# Patient Record
Sex: FEMALE | Race: WHITE | NOT HISPANIC OR LATINO
[De-identification: names, ages, dates, MRNs, and addresses within clinical notes are randomized per-mention and may not be internally consistent; named-entity substitution may affect disease eponyms.]

---

## 2017-02-21 ENCOUNTER — ASOB RESULT (OUTPATIENT)
Age: 32
End: 2017-02-21

## 2017-02-21 ENCOUNTER — APPOINTMENT (OUTPATIENT)
Dept: ANTEPARTUM | Facility: CLINIC | Age: 32
End: 2017-02-21

## 2017-03-22 ENCOUNTER — ASOB RESULT (OUTPATIENT)
Age: 32
End: 2017-03-22

## 2017-03-22 ENCOUNTER — APPOINTMENT (OUTPATIENT)
Dept: ANTEPARTUM | Facility: CLINIC | Age: 32
End: 2017-03-22

## 2017-05-03 ENCOUNTER — APPOINTMENT (OUTPATIENT)
Dept: ANTEPARTUM | Facility: CLINIC | Age: 32
End: 2017-05-03

## 2017-05-03 ENCOUNTER — ASOB RESULT (OUTPATIENT)
Age: 32
End: 2017-05-03

## 2017-06-12 ENCOUNTER — APPOINTMENT (OUTPATIENT)
Dept: ANTEPARTUM | Facility: CLINIC | Age: 32
End: 2017-06-12

## 2017-06-12 ENCOUNTER — ASOB RESULT (OUTPATIENT)
Age: 32
End: 2017-06-12

## 2017-07-03 ENCOUNTER — APPOINTMENT (OUTPATIENT)
Dept: ANTEPARTUM | Facility: CLINIC | Age: 32
End: 2017-07-03

## 2017-07-03 ENCOUNTER — OUTPATIENT (OUTPATIENT)
Dept: OUTPATIENT SERVICES | Facility: HOSPITAL | Age: 32
LOS: 1 days | End: 2017-07-03
Payer: COMMERCIAL

## 2017-07-03 ENCOUNTER — ASOB RESULT (OUTPATIENT)
Age: 32
End: 2017-07-03

## 2017-07-03 DIAGNOSIS — Z01.818 ENCOUNTER FOR OTHER PREPROCEDURAL EXAMINATION: ICD-10-CM

## 2017-07-03 PROCEDURE — 76818 FETAL BIOPHYS PROFILE W/NST: CPT

## 2017-07-03 PROCEDURE — 76816 OB US FOLLOW-UP PER FETUS: CPT

## 2017-07-05 ENCOUNTER — ASOB RESULT (OUTPATIENT)
Age: 32
End: 2017-07-05

## 2017-07-05 ENCOUNTER — OUTPATIENT (OUTPATIENT)
Dept: OUTPATIENT SERVICES | Facility: HOSPITAL | Age: 32
LOS: 1 days | End: 2017-07-05
Payer: COMMERCIAL

## 2017-07-05 ENCOUNTER — APPOINTMENT (OUTPATIENT)
Dept: ANTEPARTUM | Facility: CLINIC | Age: 32
End: 2017-07-05

## 2017-07-05 DIAGNOSIS — O48.0 POST-TERM PREGNANCY: ICD-10-CM

## 2017-07-05 DIAGNOSIS — Z36 ENCOUNTER FOR ANTENATAL SCREENING OF MOTHER: ICD-10-CM

## 2017-07-05 DIAGNOSIS — O35.8XX0 MATERNAL CARE FOR OTHER (SUSPECTED) FETAL ABNORMALITY AND DAMAGE, NOT APPLICABLE OR UNSPECIFIED: ICD-10-CM

## 2017-07-05 DIAGNOSIS — Z3A.00 WEEKS OF GESTATION OF PREGNANCY NOT SPECIFIED: ICD-10-CM

## 2017-07-05 DIAGNOSIS — O26.899 OTHER SPECIFIED PREGNANCY RELATED CONDITIONS, UNSPECIFIED TRIMESTER: ICD-10-CM

## 2017-07-05 PROCEDURE — 59025 FETAL NON-STRESS TEST: CPT | Mod: 26

## 2017-07-05 PROCEDURE — G0463: CPT

## 2017-07-05 PROCEDURE — 59025 FETAL NON-STRESS TEST: CPT

## 2017-07-06 ENCOUNTER — APPOINTMENT (OUTPATIENT)
Dept: ANTEPARTUM | Facility: CLINIC | Age: 32
End: 2017-07-06

## 2017-07-06 ENCOUNTER — INPATIENT (INPATIENT)
Facility: HOSPITAL | Age: 32
LOS: 1 days | Discharge: HOME | End: 2017-07-08
Attending: OBSTETRICS & GYNECOLOGY | Admitting: OBSTETRICS & GYNECOLOGY
Payer: COMMERCIAL

## 2017-07-06 ENCOUNTER — RESULT REVIEW (OUTPATIENT)
Age: 32
End: 2017-07-06

## 2017-07-06 VITALS — WEIGHT: 176.37 LBS | HEIGHT: 66 IN

## 2017-07-06 DIAGNOSIS — O26.899 OTHER SPECIFIED PREGNANCY RELATED CONDITIONS, UNSPECIFIED TRIMESTER: ICD-10-CM

## 2017-07-06 DIAGNOSIS — Z34.80 ENCOUNTER FOR SUPERVISION OF OTHER NORMAL PREGNANCY, UNSPECIFIED TRIMESTER: ICD-10-CM

## 2017-07-06 DIAGNOSIS — Z3A.00 WEEKS OF GESTATION OF PREGNANCY NOT SPECIFIED: ICD-10-CM

## 2017-07-06 LAB
BLD GP AB SCN SERPL QL: NEGATIVE — SIGNIFICANT CHANGE UP
HCT VFR BLD CALC: 38 % — SIGNIFICANT CHANGE UP (ref 34.5–45)
HGB BLD-MCNC: 13.4 G/DL — SIGNIFICANT CHANGE UP (ref 11.5–15.5)
MCHC RBC-ENTMCNC: 33.1 PG — SIGNIFICANT CHANGE UP (ref 27–34)
MCHC RBC-ENTMCNC: 35.2 GM/DL — SIGNIFICANT CHANGE UP (ref 32–36)
MCV RBC AUTO: 94.2 FL — SIGNIFICANT CHANGE UP (ref 80–100)
PLATELET # BLD AUTO: 249 K/UL — SIGNIFICANT CHANGE UP (ref 150–400)
RBC # BLD: 4.04 M/UL — SIGNIFICANT CHANGE UP (ref 3.8–5.2)
RBC # FLD: 12.6 % — SIGNIFICANT CHANGE UP (ref 10.3–14.5)
RH IG SCN BLD-IMP: POSITIVE — SIGNIFICANT CHANGE UP
WBC # BLD: 9.9 K/UL — SIGNIFICANT CHANGE UP (ref 3.8–10.5)
WBC # FLD AUTO: 9.9 K/UL — SIGNIFICANT CHANGE UP (ref 3.8–10.5)

## 2017-07-06 PROCEDURE — 88305 TISSUE EXAM BY PATHOLOGIST: CPT | Mod: 26

## 2017-07-06 RX ORDER — OXYTOCIN 10 UNIT/ML
41.67 VIAL (ML) INJECTION
Qty: 20 | Refills: 0 | Status: DISCONTINUED | OUTPATIENT
Start: 2017-07-06 | End: 2017-07-08

## 2017-07-06 RX ORDER — OXYCODONE HYDROCHLORIDE 5 MG/1
5 TABLET ORAL
Qty: 0 | Refills: 0 | Status: DISCONTINUED | OUTPATIENT
Start: 2017-07-06 | End: 2017-07-08

## 2017-07-06 RX ORDER — SODIUM CHLORIDE 9 MG/ML
1000 INJECTION, SOLUTION INTRAVENOUS
Qty: 0 | Refills: 0 | Status: DISCONTINUED | OUTPATIENT
Start: 2017-07-06 | End: 2017-07-08

## 2017-07-06 RX ORDER — PRAMOXINE HYDROCHLORIDE 150 MG/15G
1 AEROSOL, FOAM RECTAL EVERY 4 HOURS
Qty: 0 | Refills: 0 | Status: DISCONTINUED | OUTPATIENT
Start: 2017-07-06 | End: 2017-07-08

## 2017-07-06 RX ORDER — PENICILLIN G POTASSIUM 5000000 [IU]/1
POWDER, FOR SOLUTION INTRAMUSCULAR; INTRAPLEURAL; INTRATHECAL; INTRAVENOUS
Qty: 0 | Refills: 0 | Status: DISCONTINUED | OUTPATIENT
Start: 2017-07-06 | End: 2017-07-06

## 2017-07-06 RX ORDER — HYDROCORTISONE 1 %
1 OINTMENT (GRAM) TOPICAL EVERY 4 HOURS
Qty: 0 | Refills: 0 | Status: DISCONTINUED | OUTPATIENT
Start: 2017-07-06 | End: 2017-07-08

## 2017-07-06 RX ORDER — SIMETHICONE 80 MG/1
80 TABLET, CHEWABLE ORAL EVERY 6 HOURS
Qty: 0 | Refills: 0 | Status: DISCONTINUED | OUTPATIENT
Start: 2017-07-06 | End: 2017-07-08

## 2017-07-06 RX ORDER — DIPHENHYDRAMINE HCL 50 MG
25 CAPSULE ORAL EVERY 6 HOURS
Qty: 0 | Refills: 0 | Status: DISCONTINUED | OUTPATIENT
Start: 2017-07-06 | End: 2017-07-08

## 2017-07-06 RX ORDER — GLYCERIN ADULT
1 SUPPOSITORY, RECTAL RECTAL AT BEDTIME
Qty: 0 | Refills: 0 | Status: DISCONTINUED | OUTPATIENT
Start: 2017-07-06 | End: 2017-07-08

## 2017-07-06 RX ORDER — SODIUM CHLORIDE 9 MG/ML
500 INJECTION, SOLUTION INTRAVENOUS ONCE
Qty: 0 | Refills: 0 | Status: DISCONTINUED | OUTPATIENT
Start: 2017-07-06 | End: 2017-07-06

## 2017-07-06 RX ORDER — OXYCODONE HYDROCHLORIDE 5 MG/1
5 TABLET ORAL EVERY 4 HOURS
Qty: 0 | Refills: 0 | Status: DISCONTINUED | OUTPATIENT
Start: 2017-07-06 | End: 2017-07-08

## 2017-07-06 RX ORDER — AER TRAVELER 0.5 G/1
1 SOLUTION RECTAL; TOPICAL EVERY 4 HOURS
Qty: 0 | Refills: 0 | Status: DISCONTINUED | OUTPATIENT
Start: 2017-07-06 | End: 2017-07-08

## 2017-07-06 RX ORDER — ACETAMINOPHEN 500 MG
975 TABLET ORAL EVERY 6 HOURS
Qty: 0 | Refills: 0 | Status: COMPLETED | OUTPATIENT
Start: 2017-07-06 | End: 2018-06-04

## 2017-07-06 RX ORDER — MAGNESIUM HYDROXIDE 400 MG/1
30 TABLET, CHEWABLE ORAL
Qty: 0 | Refills: 0 | Status: DISCONTINUED | OUTPATIENT
Start: 2017-07-06 | End: 2017-07-08

## 2017-07-06 RX ORDER — OXYTOCIN 10 UNIT/ML
333.33 VIAL (ML) INJECTION
Qty: 20 | Refills: 0 | Status: DISCONTINUED | OUTPATIENT
Start: 2017-07-06 | End: 2017-07-06

## 2017-07-06 RX ORDER — DIBUCAINE 1 %
1 OINTMENT (GRAM) RECTAL EVERY 4 HOURS
Qty: 0 | Refills: 0 | Status: DISCONTINUED | OUTPATIENT
Start: 2017-07-06 | End: 2017-07-08

## 2017-07-06 RX ORDER — KETOROLAC TROMETHAMINE 30 MG/ML
30 SYRINGE (ML) INJECTION ONCE
Qty: 0 | Refills: 0 | Status: DISCONTINUED | OUTPATIENT
Start: 2017-07-06 | End: 2017-07-06

## 2017-07-06 RX ORDER — TETANUS TOXOID, REDUCED DIPHTHERIA TOXOID AND ACELLULAR PERTUSSIS VACCINE, ADSORBED 5; 2.5; 8; 8; 2.5 [IU]/.5ML; [IU]/.5ML; UG/.5ML; UG/.5ML; UG/.5ML
0.5 SUSPENSION INTRAMUSCULAR ONCE
Qty: 0 | Refills: 0 | Status: DISCONTINUED | OUTPATIENT
Start: 2017-07-06 | End: 2017-07-08

## 2017-07-06 RX ORDER — SODIUM CHLORIDE 9 MG/ML
3 INJECTION INTRAMUSCULAR; INTRAVENOUS; SUBCUTANEOUS EVERY 8 HOURS
Qty: 0 | Refills: 0 | Status: DISCONTINUED | OUTPATIENT
Start: 2017-07-06 | End: 2017-07-08

## 2017-07-06 RX ORDER — SODIUM CHLORIDE 9 MG/ML
1000 INJECTION, SOLUTION INTRAVENOUS
Qty: 0 | Refills: 0 | Status: DISCONTINUED | OUTPATIENT
Start: 2017-07-06 | End: 2017-07-06

## 2017-07-06 RX ORDER — LANOLIN
1 OINTMENT (GRAM) TOPICAL EVERY 6 HOURS
Qty: 0 | Refills: 0 | Status: DISCONTINUED | OUTPATIENT
Start: 2017-07-06 | End: 2017-07-08

## 2017-07-06 RX ORDER — OXYTOCIN 10 UNIT/ML
4 VIAL (ML) INJECTION
Qty: 30 | Refills: 0 | Status: DISCONTINUED | OUTPATIENT
Start: 2017-07-06 | End: 2017-07-08

## 2017-07-06 RX ORDER — PENICILLIN G POTASSIUM 5000000 [IU]/1
5 POWDER, FOR SOLUTION INTRAMUSCULAR; INTRAPLEURAL; INTRATHECAL; INTRAVENOUS ONCE
Qty: 0 | Refills: 0 | Status: COMPLETED | OUTPATIENT
Start: 2017-07-06 | End: 2017-07-06

## 2017-07-06 RX ORDER — CITRIC ACID/SODIUM CITRATE 300-500 MG
15 SOLUTION, ORAL ORAL EVERY 4 HOURS
Qty: 0 | Refills: 0 | Status: DISCONTINUED | OUTPATIENT
Start: 2017-07-06 | End: 2017-07-06

## 2017-07-06 RX ORDER — IBUPROFEN 200 MG
600 TABLET ORAL EVERY 6 HOURS
Qty: 0 | Refills: 0 | Status: COMPLETED | OUTPATIENT
Start: 2017-07-06 | End: 2018-06-04

## 2017-07-06 RX ORDER — DOCUSATE SODIUM 100 MG
100 CAPSULE ORAL
Qty: 0 | Refills: 0 | Status: DISCONTINUED | OUTPATIENT
Start: 2017-07-06 | End: 2017-07-08

## 2017-07-06 RX ADMIN — PENICILLIN G POTASSIUM 200 MILLION UNIT(S): 5000000 POWDER, FOR SOLUTION INTRAMUSCULAR; INTRAPLEURAL; INTRATHECAL; INTRAVENOUS at 15:30

## 2017-07-06 RX ADMIN — Medication 125 MILLIUNIT(S)/MIN: at 20:38

## 2017-07-06 RX ADMIN — Medication 30 MILLIGRAM(S): at 20:36

## 2017-07-06 RX ADMIN — Medication 30 MILLIGRAM(S): at 22:55

## 2017-07-07 LAB
HCT VFR BLD CALC: 35.5 % — SIGNIFICANT CHANGE UP (ref 34.5–45)
HGB BLD-MCNC: 12.1 G/DL — SIGNIFICANT CHANGE UP (ref 11.5–15.5)
T PALLIDUM AB TITR SER: NEGATIVE — SIGNIFICANT CHANGE UP

## 2017-07-07 RX ORDER — ACETAMINOPHEN 500 MG
975 TABLET ORAL EVERY 6 HOURS
Qty: 0 | Refills: 0 | Status: DISCONTINUED | OUTPATIENT
Start: 2017-07-07 | End: 2017-07-08

## 2017-07-07 RX ORDER — IBUPROFEN 200 MG
600 TABLET ORAL EVERY 6 HOURS
Qty: 0 | Refills: 0 | Status: DISCONTINUED | OUTPATIENT
Start: 2017-07-07 | End: 2017-07-08

## 2017-07-07 RX ADMIN — SODIUM CHLORIDE 3 MILLILITER(S): 9 INJECTION INTRAMUSCULAR; INTRAVENOUS; SUBCUTANEOUS at 06:33

## 2017-07-07 RX ADMIN — Medication 975 MILLIGRAM(S): at 01:00

## 2017-07-07 RX ADMIN — Medication 100 MILLIGRAM(S): at 06:21

## 2017-07-07 RX ADMIN — Medication 600 MILLIGRAM(S): at 11:08

## 2017-07-07 RX ADMIN — Medication 600 MILLIGRAM(S): at 23:17

## 2017-07-07 RX ADMIN — Medication 600 MILLIGRAM(S): at 05:00

## 2017-07-07 RX ADMIN — Medication 975 MILLIGRAM(S): at 11:08

## 2017-07-07 RX ADMIN — Medication 600 MILLIGRAM(S): at 17:14

## 2017-07-07 RX ADMIN — Medication 975 MILLIGRAM(S): at 07:00

## 2017-07-07 RX ADMIN — Medication 600 MILLIGRAM(S): at 04:06

## 2017-07-07 RX ADMIN — Medication 600 MILLIGRAM(S): at 17:49

## 2017-07-07 RX ADMIN — Medication 1 TABLET(S): at 11:08

## 2017-07-07 RX ADMIN — Medication 975 MILLIGRAM(S): at 11:53

## 2017-07-07 RX ADMIN — Medication 975 MILLIGRAM(S): at 06:21

## 2017-07-07 RX ADMIN — Medication 600 MILLIGRAM(S): at 11:53

## 2017-07-07 RX ADMIN — Medication 975 MILLIGRAM(S): at 00:15

## 2017-07-07 NOTE — DISCHARGE NOTE OB - CARE PROVIDER_API CALL
Annette Trujillo), Obstetrics and Gynecology  [address] [facility] [address]  [place] [address]  Phone: [phone]  Fax: [phone]

## 2017-07-07 NOTE — PROVIDER CONTACT NOTE (CHANGE IN STATUS NOTIFICATION) - ASSESSMENT
pt is alert and oriented , no c/o dizziness or nausea. no clots noted. fundus firm, VS recorded 104/67 HR= 67 O2 sat =97 room air.  2nd bag oxytocin IVF completed . pt voided 350 spontaneously.

## 2017-07-07 NOTE — DISCHARGE NOTE OB - PATIENT PORTAL LINK FT
“You can access the FollowHealth Patient Portal, offered by [facility], by registering with the following website: [email or web address]”

## 2017-07-07 NOTE — DISCHARGE NOTE OB - MATERIALS PROVIDED
[facility] Screening Program/Breastfeeding Guide and Packet/  Immunization Record/Guide to Postpartum Care/Breastfeeding Log/Breastfeeding Mother’s Support Group Information

## 2017-07-08 VITALS
SYSTOLIC BLOOD PRESSURE: 93 MMHG | RESPIRATION RATE: 18 BRPM | DIASTOLIC BLOOD PRESSURE: 68 MMHG | HEART RATE: 83 BPM | TEMPERATURE: 99 F

## 2017-07-08 PROCEDURE — 59050 FETAL MONITOR W/REPORT: CPT

## 2017-07-08 PROCEDURE — G0463: CPT

## 2017-07-08 PROCEDURE — 59025 FETAL NON-STRESS TEST: CPT

## 2017-07-08 PROCEDURE — 85027 COMPLETE CBC AUTOMATED: CPT

## 2017-07-08 PROCEDURE — 88305 TISSUE EXAM BY PATHOLOGIST: CPT

## 2017-07-08 PROCEDURE — 86900 BLOOD TYPING SEROLOGIC ABO: CPT

## 2017-07-08 PROCEDURE — 86780 TREPONEMA PALLIDUM: CPT

## 2017-07-08 PROCEDURE — 86850 RBC ANTIBODY SCREEN: CPT

## 2017-07-08 PROCEDURE — 85018 HEMOGLOBIN: CPT

## 2017-07-08 PROCEDURE — 86901 BLOOD TYPING SEROLOGIC RH(D): CPT

## 2017-07-08 RX ADMIN — Medication 600 MILLIGRAM(S): at 09:23

## 2017-07-08 RX ADMIN — Medication 600 MILLIGRAM(S): at 10:15

## 2017-07-08 RX ADMIN — Medication 600 MILLIGRAM(S): at 21:00

## 2017-07-08 RX ADMIN — Medication 975 MILLIGRAM(S): at 05:00

## 2017-07-08 RX ADMIN — Medication 100 MILLIGRAM(S): at 11:35

## 2017-07-08 RX ADMIN — Medication 975 MILLIGRAM(S): at 03:56

## 2017-07-08 RX ADMIN — Medication 600 MILLIGRAM(S): at 14:48

## 2017-07-08 RX ADMIN — Medication 600 MILLIGRAM(S): at 00:17

## 2017-07-08 RX ADMIN — Medication 600 MILLIGRAM(S): at 20:25

## 2017-07-08 RX ADMIN — Medication 600 MILLIGRAM(S): at 15:45

## 2017-07-08 NOTE — PROGRESS NOTE ADULT - PROBLEM SELECTOR PLAN 1
- Continue with po analgesia  - Increase ambulation  - Continue regular diet  - IV lock  - H&H today

## 2017-07-08 NOTE — PROGRESS NOTE ADULT - SUBJECTIVE AND OBJECTIVE BOX
Vital Signs Last 24 Hrs  T(C): 36.6 (07 Jul 2017 05:56), Max: 36.8 (06 Jul 2017 21:30)  T(F): 97.9 (07 Jul 2017 05:56), Max: 98.2 (06 Jul 2017 21:30)  HR: 76 (07 Jul 2017 05:56) (67 - 86)  BP: 96/65 (07 Jul 2017 05:56) (96/65 - 115/61)  BP(mean): 81 (06 Jul 2017 21:30) (76 - 81)  RR: 18 (07 Jul 2017 05:56) (16 - 20)  SpO2: 97% (07 Jul 2017 01:30) (97% - 100%)    Abdomen soft  Fundus Firm  Lochia WNL  Voiding  Stable
Patient seen and examined at bedside, no acute overnight events. No acute complaints, pain well controlled. Patient is ambulating, voiding spontaneously, passing gas, and tolerating regular diet.    Vital Signs Last 24 Hours  T(C): 36.6 (07-08-17 @ 06:57), Max: 36.6 (07-07-17 @ 09:20)  HR: 76 (07-08-17 @ 06:57) (76 - 77)  BP: 99/66 (07-08-17 @ 06:57) (90/61 - 108/73)  RR: 18 (07-08-17 @ 06:57) (18 - 18)  SpO2: 99% (07-07-17 @ 18:00) (98% - 99%)    Physical Exam:  General: NAD  Abdomen: Soft, non-tender, non-distended, fundus firm  Pelvic: Lochia wnl    Labs:    Blood Type: B Positive  RPR: Negative               12.1   x     )-----------( x        ( 07-07 @ 07:05 )             35.5                13.4   9.9   )-----------( 249      ( 07-06 @ 16:26 )             38.0         MEDICATIONS  (STANDING):  oxyCODONE    IR 5 milliGRAM(s) Oral every 3 hours  sodium chloride 0.9% lock flush 3 milliLiter(s) IV Push every 8 hours  oxytocin Infusion 41.667 milliUNIT(s)/Min (125 mL/Hr) IV Continuous <Continuous>  sodium chloride 0.9% lock flush 3 milliLiter(s) IV Push every 8 hours  diphtheria/tetanus/pertussis (acellular) Vaccine (ADAcel) 0.5 milliLiter(s) IntraMuscular once  prenatal multivitamin 1 Tablet(s) Oral daily  lactated ringers. 1000 milliLiter(s) (125 mL/Hr) IV Continuous <Continuous>  dextrose 5% + lactated ringers. 1000 milliLiter(s) (125 mL/Hr) IV Continuous <Continuous>  oxytocin Infusion 4 milliUNIT(s)/Min (4 mL/Hr) IV Continuous <Continuous>  acetaminophen   Tablet. 975 milliGRAM(s) Oral every 6 hours  ibuprofen  Tablet 600 milliGRAM(s) Oral every 6 hours    MEDICATIONS  (PRN):  oxyCODONE    IR 5 milliGRAM(s) Oral every 4 hours PRN Severe Pain (7 -10)  witch hazel Pads 1 Application(s) Topical every 4 hours PRN Perineal discomfort  dibucaine 1% Ointment 1 Application(s) Topical every 4 hours PRN Tenderness of the episiotomy site  hydrocortisone 1% Cream 1 Application(s) Topical every 4 hours PRN Moderate to Severe Perineal Pain  pramoxine 1%/zinc 5% Cream 1 Application(s) Topical every 4 hours PRN Moderate to Severe Perineal Pain  hydrocortisone 1% Cream 1 Application(s) Topical every 4 hours PRN Moderate to Severe Perineal Pain  pramoxine 1%/zinc 5% Cream 1 Application(s) Topical every 4 hours PRN Moderate to Severe Perineal Pain  dibucaine 1% Ointment 1 Application(s) Topical every 4 hours PRN Perineal Discomfort  lanolin Ointment 1 Application(s) Topical every 6 hours PRN Sore Nipples  witch hazel Pads 1 Application(s) Topical every 4 hours PRN Perineal Discomfort  simethicone 80 milliGRAM(s) Chew every 6 hours PRN Gas  diphenhydrAMINE   Capsule 25 milliGRAM(s) Oral every 6 hours PRN Itching  glycerin Suppository - Adult 1 Suppository(s) Rectal at bedtime PRN Constipation  magnesium hydroxide Suspension 30 milliLiter(s) Oral two times a day PRN Constipation  docusate sodium 100 milliGRAM(s) Oral two times a day PRN Stool Softening

## 2017-07-11 LAB — SURGICAL PATHOLOGY STUDY: SIGNIFICANT CHANGE UP

## 2017-08-08 ENCOUNTER — RESULT REVIEW (OUTPATIENT)
Age: 32
End: 2017-08-08

## 2018-08-01 ENCOUNTER — RESULT REVIEW (OUTPATIENT)
Age: 33
End: 2018-08-01

## 2019-08-05 ENCOUNTER — RESULT REVIEW (OUTPATIENT)
Age: 34
End: 2019-08-05

## 2020-05-21 ENCOUNTER — ASOB RESULT (OUTPATIENT)
Age: 35
End: 2020-05-21

## 2020-05-21 ENCOUNTER — APPOINTMENT (OUTPATIENT)
Dept: ANTEPARTUM | Facility: CLINIC | Age: 35
End: 2020-05-21
Payer: COMMERCIAL

## 2020-05-21 PROCEDURE — 76811 OB US DETAILED SNGL FETUS: CPT

## 2020-07-21 ENCOUNTER — LABORATORY RESULT (OUTPATIENT)
Age: 35
End: 2020-07-21

## 2020-07-22 ENCOUNTER — APPOINTMENT (OUTPATIENT)
Dept: ANTEPARTUM | Facility: CLINIC | Age: 35
End: 2020-07-22
Payer: COMMERCIAL

## 2020-07-22 ENCOUNTER — ASOB RESULT (OUTPATIENT)
Age: 35
End: 2020-07-22

## 2020-07-22 ENCOUNTER — OUTPATIENT (OUTPATIENT)
Dept: OUTPATIENT SERVICES | Facility: HOSPITAL | Age: 35
LOS: 1 days | End: 2020-07-22

## 2020-07-22 DIAGNOSIS — Z13.9 ENCOUNTER FOR SCREENING, UNSPECIFIED: ICD-10-CM

## 2020-07-22 PROCEDURE — 59000 AMNIOCENTESIS DIAGNOSTIC: CPT

## 2020-07-22 PROCEDURE — 76946 ECHO GUIDE FOR AMNIOCENTESIS: CPT | Mod: 26

## 2020-07-22 PROCEDURE — 76816 OB US FOLLOW-UP PER FETUS: CPT | Mod: 26

## 2020-07-26 ENCOUNTER — OUTPATIENT (OUTPATIENT)
Dept: OUTPATIENT SERVICES | Facility: HOSPITAL | Age: 35
LOS: 1 days | End: 2020-07-26
Payer: COMMERCIAL

## 2020-07-26 DIAGNOSIS — Z3A.00 WEEKS OF GESTATION OF PREGNANCY NOT SPECIFIED: ICD-10-CM

## 2020-07-26 DIAGNOSIS — O26.899 OTHER SPECIFIED PREGNANCY RELATED CONDITIONS, UNSPECIFIED TRIMESTER: ICD-10-CM

## 2020-07-26 LAB
ALBUMIN SERPL ELPH-MCNC: 4.1 G/DL — SIGNIFICANT CHANGE UP (ref 3.3–5)
ALP SERPL-CCNC: 134 U/L — HIGH (ref 40–120)
ALT FLD-CCNC: 8 U/L — LOW (ref 10–45)
ANION GAP SERPL CALC-SCNC: 12 MMOL/L — SIGNIFICANT CHANGE UP (ref 5–17)
APPEARANCE UR: CLEAR — SIGNIFICANT CHANGE UP
APTT BLD: 27.5 SEC — SIGNIFICANT CHANGE UP (ref 27.5–35.5)
AST SERPL-CCNC: 15 U/L — SIGNIFICANT CHANGE UP (ref 10–40)
BACTERIA # UR AUTO: NEGATIVE — SIGNIFICANT CHANGE UP
BASOPHILS # BLD AUTO: 0.03 K/UL — SIGNIFICANT CHANGE UP (ref 0–0.2)
BASOPHILS NFR BLD AUTO: 0.3 % — SIGNIFICANT CHANGE UP (ref 0–2)
BILIRUB SERPL-MCNC: 0.2 MG/DL — SIGNIFICANT CHANGE UP (ref 0.2–1.2)
BILIRUB UR-MCNC: NEGATIVE — SIGNIFICANT CHANGE UP
BUN SERPL-MCNC: 9 MG/DL — SIGNIFICANT CHANGE UP (ref 7–23)
CALCIUM SERPL-MCNC: 9.2 MG/DL — SIGNIFICANT CHANGE UP (ref 8.4–10.5)
CHLORIDE SERPL-SCNC: 104 MMOL/L — SIGNIFICANT CHANGE UP (ref 96–108)
CO2 SERPL-SCNC: 21 MMOL/L — LOW (ref 22–31)
COLOR SPEC: COLORLESS — SIGNIFICANT CHANGE UP
CREAT SERPL-MCNC: 0.54 MG/DL — SIGNIFICANT CHANGE UP (ref 0.5–1.3)
DIFF PNL FLD: ABNORMAL
EOSINOPHIL # BLD AUTO: 0.17 K/UL — SIGNIFICANT CHANGE UP (ref 0–0.5)
EOSINOPHIL NFR BLD AUTO: 1.7 % — SIGNIFICANT CHANGE UP (ref 0–6)
EPI CELLS # UR: 1 /HPF — SIGNIFICANT CHANGE UP
FIBRINOGEN PPP-MCNC: 263 MG/DL — LOW (ref 350–510)
GLUCOSE SERPL-MCNC: 101 MG/DL — HIGH (ref 70–99)
GLUCOSE UR QL: NEGATIVE — SIGNIFICANT CHANGE UP
HCT VFR BLD CALC: 36.3 % — SIGNIFICANT CHANGE UP (ref 34.5–45)
HGB BLD-MCNC: 12.3 G/DL — SIGNIFICANT CHANGE UP (ref 11.5–15.5)
HYALINE CASTS # UR AUTO: 0 /LPF — SIGNIFICANT CHANGE UP (ref 0–2)
IMM GRANULOCYTES NFR BLD AUTO: 0.2 % — SIGNIFICANT CHANGE UP (ref 0–1.5)
INR BLD: 0.96 RATIO — SIGNIFICANT CHANGE UP (ref 0.88–1.16)
KETONES UR-MCNC: NEGATIVE — SIGNIFICANT CHANGE UP
LDH SERPL L TO P-CCNC: 150 U/L — SIGNIFICANT CHANGE UP (ref 50–242)
LEUKOCYTE ESTERASE UR-ACNC: NEGATIVE — SIGNIFICANT CHANGE UP
LYMPHOCYTES # BLD AUTO: 1.84 K/UL — SIGNIFICANT CHANGE UP (ref 1–3.3)
LYMPHOCYTES # BLD AUTO: 18.6 % — SIGNIFICANT CHANGE UP (ref 13–44)
MCHC RBC-ENTMCNC: 31.4 PG — SIGNIFICANT CHANGE UP (ref 27–34)
MCHC RBC-ENTMCNC: 33.9 GM/DL — SIGNIFICANT CHANGE UP (ref 32–36)
MCV RBC AUTO: 92.6 FL — SIGNIFICANT CHANGE UP (ref 80–100)
MONOCYTES # BLD AUTO: 0.56 K/UL — SIGNIFICANT CHANGE UP (ref 0–0.9)
MONOCYTES NFR BLD AUTO: 5.7 % — SIGNIFICANT CHANGE UP (ref 2–14)
NEUTROPHILS # BLD AUTO: 7.28 K/UL — SIGNIFICANT CHANGE UP (ref 1.8–7.4)
NEUTROPHILS NFR BLD AUTO: 73.5 % — SIGNIFICANT CHANGE UP (ref 43–77)
NITRITE UR-MCNC: NEGATIVE — SIGNIFICANT CHANGE UP
NRBC # BLD: 0 /100 WBCS — SIGNIFICANT CHANGE UP (ref 0–0)
PH UR: 6.5 — SIGNIFICANT CHANGE UP (ref 5–8)
PLATELET # BLD AUTO: 193 K/UL — SIGNIFICANT CHANGE UP (ref 150–400)
POTASSIUM SERPL-MCNC: 3.9 MMOL/L — SIGNIFICANT CHANGE UP (ref 3.5–5.3)
POTASSIUM SERPL-SCNC: 3.9 MMOL/L — SIGNIFICANT CHANGE UP (ref 3.5–5.3)
PROT SERPL-MCNC: 6.3 G/DL — SIGNIFICANT CHANGE UP (ref 6–8.3)
PROT UR-MCNC: NEGATIVE — SIGNIFICANT CHANGE UP
PROTHROM AB SERPL-ACNC: 11.4 SEC — SIGNIFICANT CHANGE UP (ref 10.6–13.6)
RBC # BLD: 3.92 M/UL — SIGNIFICANT CHANGE UP (ref 3.8–5.2)
RBC # FLD: 11.9 % — SIGNIFICANT CHANGE UP (ref 10.3–14.5)
RBC CASTS # UR COMP ASSIST: 1 /HPF — SIGNIFICANT CHANGE UP (ref 0–4)
SODIUM SERPL-SCNC: 137 MMOL/L — SIGNIFICANT CHANGE UP (ref 135–145)
SP GR SPEC: 1 — LOW (ref 1.01–1.02)
URATE SERPL-MCNC: 3.3 MG/DL — SIGNIFICANT CHANGE UP (ref 2.5–7)
UROBILINOGEN FLD QL: NEGATIVE — SIGNIFICANT CHANGE UP
WBC # BLD: 9.9 K/UL — SIGNIFICANT CHANGE UP (ref 3.8–10.5)
WBC # FLD AUTO: 9.9 K/UL — SIGNIFICANT CHANGE UP (ref 3.8–10.5)
WBC UR QL: 1 /HPF — SIGNIFICANT CHANGE UP (ref 0–5)

## 2020-07-26 PROCEDURE — 83615 LACTATE (LD) (LDH) ENZYME: CPT

## 2020-07-26 PROCEDURE — G0463: CPT

## 2020-07-26 PROCEDURE — 85730 THROMBOPLASTIN TIME PARTIAL: CPT

## 2020-07-26 PROCEDURE — 80053 COMPREHEN METABOLIC PANEL: CPT

## 2020-07-26 PROCEDURE — 84550 ASSAY OF BLOOD/URIC ACID: CPT

## 2020-07-26 PROCEDURE — 85610 PROTHROMBIN TIME: CPT

## 2020-07-26 PROCEDURE — 59025 FETAL NON-STRESS TEST: CPT

## 2020-07-26 PROCEDURE — 85384 FIBRINOGEN ACTIVITY: CPT

## 2020-07-26 PROCEDURE — 81001 URINALYSIS AUTO W/SCOPE: CPT

## 2020-07-26 PROCEDURE — 85027 COMPLETE CBC AUTOMATED: CPT

## 2020-07-26 RX ORDER — ALPRAZOLAM 0.25 MG
1 TABLET ORAL
Qty: 12 | Refills: 0
Start: 2020-07-26

## 2020-08-03 LAB
APTT BLD: 28.9 SEC
INR PPP: 0.93 RATIO
PT BLD: 11.1 SEC

## 2020-08-10 ENCOUNTER — TRANSCRIPTION ENCOUNTER (OUTPATIENT)
Age: 35
End: 2020-08-10

## 2020-08-10 DIAGNOSIS — Z31.69 ENCOUNTER FOR OTHER GENERAL COUNSELING AND ADVICE ON PROCREATION: ICD-10-CM

## 2020-08-10 DIAGNOSIS — Z87.59 PERSONAL HISTORY OF OTHER COMPLICATIONS OF PREGNANCY, CHILDBIRTH AND THE PUERPERIUM: ICD-10-CM

## 2020-08-14 LAB — CHROM ANALY OVERALL INTERP SPEC-IMP: SIGNIFICANT CHANGE UP

## 2020-09-02 ENCOUNTER — ASOB RESULT (OUTPATIENT)
Age: 35
End: 2020-09-02

## 2020-09-02 ENCOUNTER — APPOINTMENT (OUTPATIENT)
Dept: MATERNAL FETAL MEDICINE | Facility: CLINIC | Age: 35
End: 2020-09-02
Payer: COMMERCIAL

## 2020-09-02 PROCEDURE — 99203 OFFICE O/P NEW LOW 30 MIN: CPT | Mod: 95

## 2020-09-05 ENCOUNTER — ASOB RESULT (OUTPATIENT)
Age: 35
End: 2020-09-05

## 2020-12-01 ENCOUNTER — RESULT REVIEW (OUTPATIENT)
Age: 35
End: 2020-12-01

## 2021-02-10 ENCOUNTER — OUTPATIENT (OUTPATIENT)
Dept: OUTPATIENT SERVICES | Facility: HOSPITAL | Age: 36
LOS: 1 days | Discharge: HOME | End: 2021-02-10
Payer: COMMERCIAL

## 2021-02-10 PROCEDURE — 90834 PSYTX W PT 45 MINUTES: CPT | Mod: 95

## 2021-02-17 ENCOUNTER — NON-APPOINTMENT (OUTPATIENT)
Age: 36
End: 2021-02-17

## 2021-02-17 ENCOUNTER — APPOINTMENT (OUTPATIENT)
Dept: CARDIOLOGY | Facility: CLINIC | Age: 36
End: 2021-02-17
Payer: COMMERCIAL

## 2021-02-17 VITALS
OXYGEN SATURATION: 99 % | HEIGHT: 66 IN | WEIGHT: 155 LBS | BODY MASS INDEX: 24.91 KG/M2 | DIASTOLIC BLOOD PRESSURE: 78 MMHG | RESPIRATION RATE: 12 BRPM | HEART RATE: 93 BPM | TEMPERATURE: 96.9 F | SYSTOLIC BLOOD PRESSURE: 121 MMHG

## 2021-02-17 DIAGNOSIS — R00.2 PALPITATIONS: ICD-10-CM

## 2021-02-17 DIAGNOSIS — R11.0 NAUSEA: ICD-10-CM

## 2021-02-17 DIAGNOSIS — F41.9 ANXIETY DISORDER, UNSPECIFIED: ICD-10-CM

## 2021-02-17 DIAGNOSIS — R07.9 CHEST PAIN, UNSPECIFIED: ICD-10-CM

## 2021-02-17 PROCEDURE — 99072 ADDL SUPL MATRL&STAF TM PHE: CPT

## 2021-02-17 PROCEDURE — 99243 OFF/OP CNSLTJ NEW/EST LOW 30: CPT

## 2021-02-17 PROCEDURE — 93000 ELECTROCARDIOGRAM COMPLETE: CPT

## 2021-02-17 RX ORDER — ASPIRIN ENTERIC COATED TABLETS 81 MG 81 MG/1
81 TABLET, DELAYED RELEASE ORAL
Qty: 30 | Refills: 5 | Status: ACTIVE | COMMUNITY
Start: 2021-02-17

## 2021-02-17 RX ORDER — ONDANSETRON 4 MG/1
4 TABLET ORAL
Refills: 4 | Status: ACTIVE | COMMUNITY
Start: 2021-02-17

## 2021-02-17 RX ORDER — SERTRALINE 25 MG/1
25 TABLET, FILM COATED ORAL
Qty: 30 | Refills: 3 | Status: ACTIVE | COMMUNITY
Start: 2021-02-17

## 2021-02-17 RX ORDER — DOXYLAMINE SUCCINATE AND PYRIDOXINE HYDROCHLORIDE 10; 10 MG/1; MG/1
10-10 TABLET, DELAYED RELEASE ORAL
Qty: 30 | Refills: 3 | Status: ACTIVE | COMMUNITY
Start: 2021-02-17

## 2021-02-17 NOTE — PHYSICAL EXAM
[General Appearance - Well Developed] : well developed [Normal Appearance] : normal appearance [Well Groomed] : well groomed [General Appearance - Well Nourished] : well nourished [No Deformities] : no deformities [General Appearance - In No Acute Distress] : no acute distress [Normal Conjunctiva] : the conjunctiva exhibited no abnormalities [Eyelids - No Xanthelasma] : the eyelids demonstrated no xanthelasmas [Normal Oral Mucosa] : normal oral mucosa [No Oral Pallor] : no oral pallor [No Oral Cyanosis] : no oral cyanosis [Normal Jugular Venous A Waves Present] : normal jugular venous A waves present [Normal Jugular Venous V Waves Present] : normal jugular venous V waves present [No Jugular Venous Styles A Waves] : no jugular venous styles A waves [Heart Rate And Rhythm] : heart rate and rhythm were normal [Heart Sounds] : normal S1 and S2 [Murmurs] : no murmurs present [Respiration, Rhythm And Depth] : normal respiratory rhythm and effort [Exaggerated Use Of Accessory Muscles For Inspiration] : no accessory muscle use [Auscultation Breath Sounds / Voice Sounds] : lungs were clear to auscultation bilaterally [Abdomen Soft] : soft [Abdomen Tenderness] : non-tender [Abdomen Mass (___ Cm)] : no abdominal mass palpated [FreeTextEntry1] : gravid [Abnormal Walk] : normal gait [Gait - Sufficient For Exercise Testing] : the gait was sufficient for exercise testing [Nail Clubbing] : no clubbing of the fingernails [Cyanosis, Localized] : no localized cyanosis [Petechial Hemorrhages (___cm)] : no petechial hemorrhages [Skin Color & Pigmentation] : normal skin color and pigmentation [] : no rash [No Venous Stasis] : no venous stasis [Skin Lesions] : no skin lesions [No Skin Ulcers] : no skin ulcer [No Xanthoma] : no  xanthoma was observed [Oriented To Time, Place, And Person] : oriented to person, place, and time [Affect] : the affect was normal [Mood] : the mood was normal [No Anxiety] : not feeling anxious

## 2021-02-17 NOTE — DISCUSSION/SUMMARY
[FreeTextEntry1] : The patient is a 35-year-old female 8 weeks gestation with palpitations and atypical chest pain. \par #1 CV- ECG normal, echo ordered and holter placed\par #2 Anxiety- most likely cause of symptoms after the loss at 30 weeks in December\par #3 Ob- emesis improving, this will be her fourth child

## 2021-02-17 NOTE — REVIEW OF SYSTEMS
[Shortness Of Breath] : no shortness of breath [Dyspnea on exertion] : not dyspnea during exertion [Chest Pain] : chest pain [Lower Ext Edema] : no extremity edema [Palpitations] : palpitations [Negative] : Heme/Lymph

## 2021-02-17 NOTE — HISTORY OF PRESENT ILLNESS
[FreeTextEntry1] : Megan is a 35-year-old female 8 weeks for fourth child, lost pregnancy at 30 weeks in December. She is now feeling her heart beat stronger and a pain in her chest more after activity. Sometimes when lays down at night. She admits to being anxious. Children 10, 6 , 3 years old.

## 2021-02-23 ENCOUNTER — NON-APPOINTMENT (OUTPATIENT)
Age: 36
End: 2021-02-23

## 2021-02-25 PROCEDURE — 99213 OFFICE O/P EST LOW 20 MIN: CPT | Mod: 95

## 2021-03-04 ENCOUNTER — APPOINTMENT (OUTPATIENT)
Dept: ANTEPARTUM | Facility: CLINIC | Age: 36
End: 2021-03-04
Payer: COMMERCIAL

## 2021-03-04 ENCOUNTER — ASOB RESULT (OUTPATIENT)
Age: 36
End: 2021-03-04

## 2021-03-04 PROCEDURE — 76811 OB US DETAILED SNGL FETUS: CPT

## 2021-03-04 PROCEDURE — 99072 ADDL SUPL MATRL&STAF TM PHE: CPT

## 2021-03-09 ENCOUNTER — APPOINTMENT (OUTPATIENT)
Dept: CARDIOLOGY | Facility: CLINIC | Age: 36
End: 2021-03-09
Payer: COMMERCIAL

## 2021-03-09 PROCEDURE — 99072 ADDL SUPL MATRL&STAF TM PHE: CPT

## 2021-03-09 PROCEDURE — 93306 TTE W/DOPPLER COMPLETE: CPT

## 2021-03-16 PROCEDURE — 99214 OFFICE O/P EST MOD 30 MIN: CPT | Mod: 95

## 2021-03-19 DIAGNOSIS — F41.1 GENERALIZED ANXIETY DISORDER: ICD-10-CM

## 2021-03-19 DIAGNOSIS — F45.21 HYPOCHONDRIASIS: ICD-10-CM

## 2021-04-06 PROCEDURE — 99213 OFFICE O/P EST LOW 20 MIN: CPT | Mod: 95

## 2021-05-04 PROCEDURE — 99214 OFFICE O/P EST MOD 30 MIN: CPT | Mod: 95

## 2021-06-01 PROCEDURE — 99214 OFFICE O/P EST MOD 30 MIN: CPT | Mod: 95

## 2021-06-22 PROCEDURE — 99214 OFFICE O/P EST MOD 30 MIN: CPT | Mod: 95

## 2021-07-08 PROCEDURE — 99214 OFFICE O/P EST MOD 30 MIN: CPT

## 2021-07-13 ENCOUNTER — INPATIENT (INPATIENT)
Facility: HOSPITAL | Age: 36
LOS: 1 days | Discharge: HOME | End: 2021-07-15
Attending: OBSTETRICS & GYNECOLOGY | Admitting: OBSTETRICS & GYNECOLOGY
Payer: COMMERCIAL

## 2021-07-13 VITALS — HEART RATE: 76 BPM | OXYGEN SATURATION: 100 %

## 2021-07-13 DIAGNOSIS — Z3A.00 WEEKS OF GESTATION OF PREGNANCY NOT SPECIFIED: ICD-10-CM

## 2021-07-13 DIAGNOSIS — Z34.80 ENCOUNTER FOR SUPERVISION OF OTHER NORMAL PREGNANCY, UNSPECIFIED TRIMESTER: ICD-10-CM

## 2021-07-13 DIAGNOSIS — O26.899 OTHER SPECIFIED PREGNANCY RELATED CONDITIONS, UNSPECIFIED TRIMESTER: ICD-10-CM

## 2021-07-13 LAB
BASOPHILS # BLD AUTO: 0.04 K/UL — SIGNIFICANT CHANGE UP (ref 0–0.2)
BASOPHILS NFR BLD AUTO: 0.3 % — SIGNIFICANT CHANGE UP (ref 0–2)
BLD GP AB SCN SERPL QL: NEGATIVE — SIGNIFICANT CHANGE UP
EOSINOPHIL # BLD AUTO: 0.12 K/UL — SIGNIFICANT CHANGE UP (ref 0–0.5)
EOSINOPHIL NFR BLD AUTO: 1 % — SIGNIFICANT CHANGE UP (ref 0–6)
HCT VFR BLD CALC: 33.5 % — LOW (ref 34.5–45)
HGB BLD-MCNC: 11.7 G/DL — SIGNIFICANT CHANGE UP (ref 11.5–15.5)
IMM GRANULOCYTES NFR BLD AUTO: 0.7 % — SIGNIFICANT CHANGE UP (ref 0–1.5)
LYMPHOCYTES # BLD AUTO: 18.9 % — SIGNIFICANT CHANGE UP (ref 13–44)
LYMPHOCYTES # BLD AUTO: 2.2 K/UL — SIGNIFICANT CHANGE UP (ref 1–3.3)
MCHC RBC-ENTMCNC: 31.6 PG — SIGNIFICANT CHANGE UP (ref 27–34)
MCHC RBC-ENTMCNC: 34.9 GM/DL — SIGNIFICANT CHANGE UP (ref 32–36)
MCV RBC AUTO: 90.5 FL — SIGNIFICANT CHANGE UP (ref 80–100)
MONOCYTES # BLD AUTO: 0.75 K/UL — SIGNIFICANT CHANGE UP (ref 0–0.9)
MONOCYTES NFR BLD AUTO: 6.4 % — SIGNIFICANT CHANGE UP (ref 2–14)
NEUTROPHILS # BLD AUTO: 8.45 K/UL — HIGH (ref 1.8–7.4)
NEUTROPHILS NFR BLD AUTO: 72.7 % — SIGNIFICANT CHANGE UP (ref 43–77)
NRBC # BLD: 0 /100 WBCS — SIGNIFICANT CHANGE UP (ref 0–0)
PLATELET # BLD AUTO: 224 K/UL — SIGNIFICANT CHANGE UP (ref 150–400)
RBC # BLD: 3.7 M/UL — LOW (ref 3.8–5.2)
RBC # FLD: 13.8 % — SIGNIFICANT CHANGE UP (ref 10.3–14.5)
RH IG SCN BLD-IMP: POSITIVE — SIGNIFICANT CHANGE UP
WBC # BLD: 11.64 K/UL — HIGH (ref 3.8–10.5)
WBC # FLD AUTO: 11.64 K/UL — HIGH (ref 3.8–10.5)

## 2021-07-13 PROCEDURE — 88307 TISSUE EXAM BY PATHOLOGIST: CPT | Mod: 26

## 2021-07-13 RX ORDER — IBUPROFEN 200 MG
600 TABLET ORAL EVERY 6 HOURS
Refills: 0 | Status: COMPLETED | OUTPATIENT
Start: 2021-07-13 | End: 2022-06-11

## 2021-07-13 RX ORDER — SERTRALINE 25 MG/1
125 TABLET, FILM COATED ORAL DAILY
Refills: 0 | Status: DISCONTINUED | OUTPATIENT
Start: 2021-07-13 | End: 2021-07-15

## 2021-07-13 RX ORDER — TETANUS TOXOID, REDUCED DIPHTHERIA TOXOID AND ACELLULAR PERTUSSIS VACCINE, ADSORBED 5; 2.5; 8; 8; 2.5 [IU]/.5ML; [IU]/.5ML; UG/.5ML; UG/.5ML; UG/.5ML
0.5 SUSPENSION INTRAMUSCULAR ONCE
Refills: 0 | Status: DISCONTINUED | OUTPATIENT
Start: 2021-07-13 | End: 2021-07-15

## 2021-07-13 RX ORDER — SODIUM CHLORIDE 9 MG/ML
1000 INJECTION, SOLUTION INTRAVENOUS
Refills: 0 | Status: DISCONTINUED | OUTPATIENT
Start: 2021-07-13 | End: 2021-07-13

## 2021-07-13 RX ORDER — OXYTOCIN 10 UNIT/ML
333.33 VIAL (ML) INJECTION
Qty: 20 | Refills: 0 | Status: DISCONTINUED | OUTPATIENT
Start: 2021-07-13 | End: 2021-07-13

## 2021-07-13 RX ORDER — SIMETHICONE 80 MG/1
80 TABLET, CHEWABLE ORAL EVERY 4 HOURS
Refills: 0 | Status: DISCONTINUED | OUTPATIENT
Start: 2021-07-13 | End: 2021-07-15

## 2021-07-13 RX ORDER — BENZOCAINE 10 %
1 GEL (GRAM) MUCOUS MEMBRANE EVERY 6 HOURS
Refills: 0 | Status: DISCONTINUED | OUTPATIENT
Start: 2021-07-13 | End: 2021-07-15

## 2021-07-13 RX ORDER — MAGNESIUM HYDROXIDE 400 MG/1
30 TABLET, CHEWABLE ORAL
Refills: 0 | Status: DISCONTINUED | OUTPATIENT
Start: 2021-07-13 | End: 2021-07-15

## 2021-07-13 RX ORDER — DIPHENHYDRAMINE HCL 50 MG
25 CAPSULE ORAL EVERY 6 HOURS
Refills: 0 | Status: DISCONTINUED | OUTPATIENT
Start: 2021-07-13 | End: 2021-07-15

## 2021-07-13 RX ORDER — SODIUM CHLORIDE 9 MG/ML
3 INJECTION INTRAMUSCULAR; INTRAVENOUS; SUBCUTANEOUS EVERY 8 HOURS
Refills: 0 | Status: DISCONTINUED | OUTPATIENT
Start: 2021-07-13 | End: 2021-07-15

## 2021-07-13 RX ORDER — LANOLIN
1 OINTMENT (GRAM) TOPICAL EVERY 6 HOURS
Refills: 0 | Status: DISCONTINUED | OUTPATIENT
Start: 2021-07-13 | End: 2021-07-15

## 2021-07-13 RX ORDER — OXYCODONE HYDROCHLORIDE 5 MG/1
5 TABLET ORAL ONCE
Refills: 0 | Status: DISCONTINUED | OUTPATIENT
Start: 2021-07-13 | End: 2021-07-15

## 2021-07-13 RX ORDER — OXYTOCIN 10 UNIT/ML
333.33 VIAL (ML) INJECTION
Qty: 20 | Refills: 0 | Status: DISCONTINUED | OUTPATIENT
Start: 2021-07-13 | End: 2021-07-15

## 2021-07-13 RX ORDER — ACETAMINOPHEN 500 MG
975 TABLET ORAL
Refills: 0 | Status: DISCONTINUED | OUTPATIENT
Start: 2021-07-13 | End: 2021-07-15

## 2021-07-13 RX ORDER — PRAMOXINE HYDROCHLORIDE 150 MG/15G
1 AEROSOL, FOAM RECTAL EVERY 4 HOURS
Refills: 0 | Status: DISCONTINUED | OUTPATIENT
Start: 2021-07-13 | End: 2021-07-15

## 2021-07-13 RX ORDER — AER TRAVELER 0.5 G/1
1 SOLUTION RECTAL; TOPICAL EVERY 4 HOURS
Refills: 0 | Status: DISCONTINUED | OUTPATIENT
Start: 2021-07-13 | End: 2021-07-15

## 2021-07-13 RX ORDER — KETOROLAC TROMETHAMINE 30 MG/ML
30 SYRINGE (ML) INJECTION ONCE
Refills: 0 | Status: DISCONTINUED | OUTPATIENT
Start: 2021-07-13 | End: 2021-07-14

## 2021-07-13 RX ORDER — HYDROCORTISONE 1 %
1 OINTMENT (GRAM) TOPICAL EVERY 6 HOURS
Refills: 0 | Status: DISCONTINUED | OUTPATIENT
Start: 2021-07-13 | End: 2021-07-15

## 2021-07-13 RX ORDER — CITRIC ACID/SODIUM CITRATE 300-500 MG
15 SOLUTION, ORAL ORAL EVERY 6 HOURS
Refills: 0 | Status: DISCONTINUED | OUTPATIENT
Start: 2021-07-13 | End: 2021-07-13

## 2021-07-13 RX ORDER — OXYCODONE HYDROCHLORIDE 5 MG/1
5 TABLET ORAL
Refills: 0 | Status: DISCONTINUED | OUTPATIENT
Start: 2021-07-13 | End: 2021-07-15

## 2021-07-13 RX ORDER — DIBUCAINE 1 %
1 OINTMENT (GRAM) RECTAL EVERY 6 HOURS
Refills: 0 | Status: DISCONTINUED | OUTPATIENT
Start: 2021-07-13 | End: 2021-07-15

## 2021-07-13 RX ADMIN — Medication 1000 MILLIUNIT(S)/MIN: at 23:54

## 2021-07-13 NOTE — OB RN DELIVERY SUMMARY - NS_SEPSISRSKCALC_OBGYN_ALL_OB_FT
GBS status in the 'Prenatal Lab tests/results section' on the OB RN Patient Profile must be documented.   EOS calculated successfully. EOS Risk Factor: 0.5/1000 live births ([facility] national incidence); GA=39w;Temp=98.42; ROM=4.133; GBS='Negative'; Antibiotics='No antibiotics or any antibiotics < 2 hrs prior to birth'

## 2021-07-13 NOTE — OB RN PATIENT PROFILE - PMH
Anxiety  Anxiety on Zoloft  Fetal demise in aivla pregnancy greater than 22 weeks gestation, antepartum  30GA Fetal demise  Miscarriage     (normal spontaneous vaginal delivery)   x4

## 2021-07-13 NOTE — OB RN DELIVERY SUMMARY - NSSELHIDDEN_OBGYN_ALL_OB_FT
[NS_DeliveryAttending1_OBGYN_ALL_OB_FT:[email or web address]=],[NS_DeliveryRN_OBGYN_ALL_OB_FT:[id number]==]

## 2021-07-13 NOTE — OB PROVIDER DELIVERY SUMMARY - NSPROVIDERDELIVERYNOTE_OBGYN_ALL_OB_FT
, pt delivered of a viable female infant apgar 9/9, placenta complete, uterus explored. no laceration. ebl 300 cc. cytotec 1000 mcg rectally.

## 2021-07-13 NOTE — OB PROVIDER H&P - HISTORY OF PRESENT ILLNESS
36yo  @ 39.0 weeks (CYNDI ) presents with SROM from 6:30pm, in labor. Pregnancy uncomplicated. +FM, -VB    GBS neg  EFW 3000    OBHx:  x3, , , 2017, largest 6#12, SAB w/ D&C , IUFD @ 30 weeks, s/p D&E, negative workup   GYNHx: No ovarian cysts, fibroids, hx of abnormal pap or STDs  PMHx: No hx of DM, HTN, asthma, bleeding or clotting disorders or blood transfusions.  PSurgHx: lap john in   Allergies: NKDA  Meds: PNV, zoloft 125mg  Social: No smoking, alcohol, or illicit drug use during pregnancy   Psych: Anxiety

## 2021-07-13 NOTE — OB RN PATIENT PROFILE - CAREGIVER NAME
Clinic hours for [facility]:  Monday 7am - 3:30pm  Tuesday 7am - 3:30pm  Wednesday 7am - 3:30pm  Thursday 7am - 3:30pm  Friday  7am - 3pm    If you need a refill on your prescription please call your pharmacy and let them know. Please be proactive and call before your medication runs out. The pharmacy will then contact us for the refill. Please allow 24-48 hours for the refill to be processed.     If your physician has ordered additional laboratory or radiology testing as part of your ongoing plan of care, please allow 7-10 business days from the day of your lab draw or test for the results to be sent and reviewed by your provider. If your results are critical and require more immediate intervention, you will be contacted sooner. Your results will be conveyed to you via a phone call or letter.    You may be receiving a patient satisfaction survey in the mail. Please take the time to complete, as your feedback is very important to us. We strive to make your experience exceptional and your comments help us with that goal. We look forward to hearing from you.       Rahul Bailey

## 2021-07-13 NOTE — OB PROVIDER H&P - ASSESSMENT
A/P: 35yp  @ 39.0 weeks SROM, in labor  - Admit to L&D  - Routine labs   - COVID swab for PCR  - EFM/TOCO  - Clear liquid diet  - IVH  - Anesthesia consult   - Bicitra  - expectant mgmt at this time  - Expect     dw Dr Trujillo who is in house  LCavalieri PAC

## 2021-07-13 NOTE — OB PROVIDER H&P - NSHPPHYSICALEXAM_GEN_ALL_CORE
PE:  T(C): --  HR: 79 (07-13-21 @ 20:28) (76 - 84)  BP: 110/64 (07-13-21 @ 20:21) (110/64 - 110/64)  RR: 16 (07-13-21 @ 20:21) (16 - 16)  SpO2: 99% (07-13-21 @ 20:28) (99% - 100%)  General: NAD, A&Ox3  CV: RRR  Lungs: Clear bilat   Abd: soft, nontender, gravid  VE: 4/80/-3, grossly ruptured by MD Trujillo   EFM: 130/moderate variablity/+accels/-decels  TOCO: q2mins  BSUS: vertex

## 2021-07-14 DIAGNOSIS — Z90.49 ACQUIRED ABSENCE OF OTHER SPECIFIED PARTS OF DIGESTIVE TRACT: Chronic | ICD-10-CM

## 2021-07-14 LAB
COVID-19 SPIKE DOMAIN AB INTERP: POSITIVE
COVID-19 SPIKE DOMAIN ANTIBODY RESULT: >250 U/ML — HIGH
SARS-COV-2 IGG+IGM SERPL QL IA: >250 U/ML — HIGH
SARS-COV-2 IGG+IGM SERPL QL IA: POSITIVE
SARS-COV-2 RNA SPEC QL NAA+PROBE: SIGNIFICANT CHANGE UP
T PALLIDUM AB TITR SER: NEGATIVE — SIGNIFICANT CHANGE UP

## 2021-07-14 RX ORDER — IBUPROFEN 200 MG
600 TABLET ORAL EVERY 6 HOURS
Refills: 0 | Status: DISCONTINUED | OUTPATIENT
Start: 2021-07-14 | End: 2021-07-15

## 2021-07-14 RX ORDER — SENNA PLUS 8.6 MG/1
2 TABLET ORAL AT BEDTIME
Refills: 0 | Status: DISCONTINUED | OUTPATIENT
Start: 2021-07-14 | End: 2021-07-15

## 2021-07-14 RX ADMIN — Medication 600 MILLIGRAM(S): at 20:28

## 2021-07-14 RX ADMIN — Medication 600 MILLIGRAM(S): at 12:40

## 2021-07-14 RX ADMIN — Medication 975 MILLIGRAM(S): at 09:41

## 2021-07-14 RX ADMIN — Medication 30 MILLIGRAM(S): at 02:26

## 2021-07-14 RX ADMIN — Medication 600 MILLIGRAM(S): at 13:10

## 2021-07-14 RX ADMIN — Medication 975 MILLIGRAM(S): at 10:15

## 2021-07-14 RX ADMIN — Medication 600 MILLIGRAM(S): at 21:15

## 2021-07-14 RX ADMIN — Medication 975 MILLIGRAM(S): at 23:44

## 2021-07-14 RX ADMIN — SERTRALINE 125 MILLIGRAM(S): 25 TABLET, FILM COATED ORAL at 10:24

## 2021-07-15 ENCOUNTER — TRANSCRIPTION ENCOUNTER (OUTPATIENT)
Age: 36
End: 2021-07-15

## 2021-07-15 VITALS
OXYGEN SATURATION: 98 % | SYSTOLIC BLOOD PRESSURE: 111 MMHG | DIASTOLIC BLOOD PRESSURE: 73 MMHG | HEART RATE: 57 BPM | TEMPERATURE: 98 F | RESPIRATION RATE: 18 BRPM

## 2021-07-15 PROCEDURE — 86780 TREPONEMA PALLIDUM: CPT

## 2021-07-15 PROCEDURE — 86850 RBC ANTIBODY SCREEN: CPT

## 2021-07-15 PROCEDURE — 88307 TISSUE EXAM BY PATHOLOGIST: CPT

## 2021-07-15 PROCEDURE — G0463: CPT

## 2021-07-15 PROCEDURE — 87635 SARS-COV-2 COVID-19 AMP PRB: CPT

## 2021-07-15 PROCEDURE — 59050 FETAL MONITOR W/REPORT: CPT

## 2021-07-15 PROCEDURE — 86900 BLOOD TYPING SEROLOGIC ABO: CPT

## 2021-07-15 PROCEDURE — 86769 SARS-COV-2 COVID-19 ANTIBODY: CPT

## 2021-07-15 PROCEDURE — 85025 COMPLETE CBC W/AUTO DIFF WBC: CPT

## 2021-07-15 PROCEDURE — 86901 BLOOD TYPING SEROLOGIC RH(D): CPT

## 2021-07-15 PROCEDURE — 59025 FETAL NON-STRESS TEST: CPT

## 2021-07-15 RX ORDER — ACETAMINOPHEN 500 MG
3 TABLET ORAL
Qty: 0 | Refills: 0 | DISCHARGE
Start: 2021-07-15

## 2021-07-15 RX ORDER — IBUPROFEN 200 MG
1 TABLET ORAL
Qty: 0 | Refills: 0 | DISCHARGE
Start: 2021-07-15

## 2021-07-15 RX ADMIN — Medication 600 MILLIGRAM(S): at 06:00

## 2021-07-15 RX ADMIN — Medication 975 MILLIGRAM(S): at 00:30

## 2021-07-15 RX ADMIN — Medication 600 MILLIGRAM(S): at 05:19

## 2021-07-15 NOTE — PROGRESS NOTE ADULT - ASSESSMENT
A/P: 35y  PPD # 1   S/P  w/ intact perineum and EBL 300cc. Doing well. 
Per Dr. Trujillo-  Abdomen: Soft, nontender, non-distended, fundus firm.  Vaginal: Lochia WNL.   Ext: Neg edema, Neg calf tenderness

## 2021-07-15 NOTE — LACTATION INITIAL EVALUATION - LACTATION INTERVENTIONS
initiate/review safe skin-to-skin/initiate/review techniques for position and latch/post discharge community resources provided/review techniques to manage sore nipples/engorgement/reviewed components of an effective feeding and at least 8 effective feedings per day required/reviewed importance of monitoring infant diapers, the breastfeeding log, and minimum output each day/reviewed risks of unnecessary formula supplementation/reviewed feeding on demand/by cue at least 8 times a day/recommended follow-up with pediatrician within 24 hours of discharge

## 2021-07-15 NOTE — DISCHARGE NOTE OB - CARE PROVIDER_API CALL
Annette Trujillo  OBSTETRICS AND GYNECOLOGY  [address]  [place] [address]  Phone: [phone]  Fax: [phone]  Follow Up Time:

## 2021-07-15 NOTE — DISCHARGE NOTE OB - CARE PLAN
Principal Discharge DX:	 (normal spontaneous vaginal delivery)  Goal:	recovery  Assessment and plan of treatment:	return to baseline health, regular diet, pain control, follow up with Dr Trujillo

## 2021-07-15 NOTE — PROGRESS NOTE ADULT - SUBJECTIVE AND OBJECTIVE BOX
Postpartum Note- PPD#2      S:Patient w/o complaints, pain is controlled.    Pt is OOB, tolerating PO, voiding. Vaginal bleeding mild to moderate.       O:  Vital Signs Last 24 Hrs  T(C): 36.4 (15 Jul 2021 05:11), Max: 36.4 (14 Jul 2021 13:00)  T(F): 97.5 (15 Jul 2021 05:11), Max: 97.6 (14 Jul 2021 17:14)  HR: 57 (15 Jul 2021 05:11) (57 - 66)  BP: 111/73 (15 Jul 2021 05:11) (97/64 - 111/73)  BP(mean): --  RR: 18 (15 Jul 2021 05:11) (18 - 18)  SpO2: 98% (15 Jul 2021 05:11) (97% - 99%)         LABS:    Hemoglobin: 11.7 g/dL (07-13 @ 21:21)      Hematocrit: 33.5 % (07-13 @ 21:21)              
Postpartum Note- PPD#1    Prenatal Labs  Blood type: B Positive  Rubella IgG: immune  RPR: Negative      S:Patient w/o complaints, pain is controlled.    Pt is OOB, tolerating PO, voiding. Vaginal bleeding mild to moderate.  Denies dizziness, CP, SOB, n/v, abdominal pain or calf pain.    pmhx/surg:  x4 (last birth was a IUFD),  Anxiety,  h/o cholecystectomy     O:  Vital Signs Last 24 Hrs  T(C): 36.7 (2021 06:07), Max: 36.9 (2021 20:10)  T(F): 98.1 (2021 06:07), Max: 98.42 (2021 20:10)  HR: 61 (2021 06:07) (61 - 92)  BP: 101/66 (2021 06:07) (93/50 - 120/61)  BP(mean): --  RR: 18 (2021 06:07) (16 - 18)  SpO2: 96% (2021 06:07) (79% - 100%)     Gen: NAD  Abdomen: Soft, nontender, non-distended, fundus firm.  Vaginal: Lochia WNL.   Ext: Neg edema, Neg calf tenderness    LABS:    Hemoglobin: 11.7 g/dL ( @ 21:21)      Hematocrit: 33.5 % ( @ 21:21)

## 2021-07-15 NOTE — DISCHARGE NOTE OB - MEDICATION SUMMARY - MEDICATIONS TO TAKE
I will START or STAY ON the medications listed below when I get home from the hospital:    acetaminophen 325 mg oral tablet  -- 3 tab(s) by mouth   -- Indication: For Mild pain    ibuprofen 600 mg oral tablet  -- 1 tab(s) by mouth every 6 hours  -- Indication: For cramping

## 2021-07-15 NOTE — PROGRESS NOTE ADULT - PROBLEM SELECTOR PLAN 1
Increase OOB  Regular diet  PO Pain protocol  Routine Postpartum Care.  Pt to be discharged.
Increase OOB.  Regular diet.  PO Pain protocol.  Routine Postpartum Care.

## 2021-07-15 NOTE — DISCHARGE NOTE OB - PATIENT PORTAL LINK FT
You can access the FollowMyHealth Patient Portal offered by [facility] by registering at the following website: [email or web address]. By joining [facility]’s FollowMyHealth portal, you will also be able to view your health information using other applications (apps) compatible with our system.

## 2021-07-28 PROCEDURE — 99214 OFFICE O/P EST MOD 30 MIN: CPT | Mod: 95

## 2021-08-11 PROCEDURE — 99214 OFFICE O/P EST MOD 30 MIN: CPT | Mod: 95

## 2021-08-14 LAB — SURGICAL PATHOLOGY STUDY: SIGNIFICANT CHANGE UP

## 2021-10-12 PROCEDURE — 99214 OFFICE O/P EST MOD 30 MIN: CPT

## 2021-10-27 PROCEDURE — 99214 OFFICE O/P EST MOD 30 MIN: CPT | Mod: 95

## 2021-11-23 PROCEDURE — 99214 OFFICE O/P EST MOD 30 MIN: CPT | Mod: 95

## 2021-12-08 PROCEDURE — 90834 PSYTX W PT 45 MINUTES: CPT | Mod: 95

## 2021-12-16 NOTE — OB RN PATIENT PROFILE - ABILITY TO HEAR (WITH HEARING AID OR HEARING APPLIANCE IF NORMALLY USED):
[No Acute Distress] : no acute distress [Well Nourished] : well nourished [Well Developed] : well developed [Well-Appearing] : well-appearing [Normal Sclera/Conjunctiva] : normal sclera/conjunctiva [PERRL] : pupils equal round and reactive to light Adequate: hears normal conversation without difficulty [EOMI] : extraocular movements intact [Normal Outer Ear/Nose] : the outer ears and nose were normal in appearance [Normal Oropharynx] : the oropharynx was normal [No JVD] : no jugular venous distention [No Lymphadenopathy] : no lymphadenopathy [Supple] : supple [Thyroid Normal, No Nodules] : the thyroid was normal and there were no nodules present [No Respiratory Distress] : no respiratory distress  [No Accessory Muscle Use] : no accessory muscle use [Clear to Auscultation] : lungs were clear to auscultation bilaterally [Normal Rate] : normal rate  [Regular Rhythm] : with a regular rhythm [Normal S1, S2] : normal S1 and S2 [No Murmur] : no murmur heard [No Carotid Bruits] : no carotid bruits [No Abdominal Bruit] : a ~M bruit was not heard ~T in the abdomen [No Varicosities] : no varicosities [Pedal Pulses Present] : the pedal pulses are present [No Edema] : there was no peripheral edema [No Palpable Aorta] : no palpable aorta [No Extremity Clubbing/Cyanosis] : no extremity clubbing/cyanosis [Soft] : abdomen soft [Non Tender] : non-tender [Non-distended] : non-distended [No Masses] : no abdominal mass palpated [No HSM] : no HSM [Normal Bowel Sounds] : normal bowel sounds [Normal Posterior Cervical Nodes] : no posterior cervical lymphadenopathy [Normal Anterior Cervical Nodes] : no anterior cervical lymphadenopathy [No CVA Tenderness] : no CVA  tenderness [No Spinal Tenderness] : no spinal tenderness [No Joint Swelling] : no joint swelling [Grossly Normal Strength/Tone] : grossly normal strength/tone [No Rash] : no rash [Coordination Grossly Intact] : coordination grossly intact [No Focal Deficits] : no focal deficits [Normal Gait] : normal gait [Deep Tendon Reflexes (DTR)] : deep tendon reflexes were 2+ and symmetric [Normal Affect] : the affect was normal [Normal Insight/Judgement] : insight and judgment were intact

## 2021-12-22 PROCEDURE — 90834 PSYTX W PT 45 MINUTES: CPT | Mod: 95

## 2022-01-05 PROCEDURE — 90834 PSYTX W PT 45 MINUTES: CPT | Mod: 95

## 2022-01-19 PROCEDURE — 90834 PSYTX W PT 45 MINUTES: CPT | Mod: 95

## 2022-01-27 PROCEDURE — 99214 OFFICE O/P EST MOD 30 MIN: CPT | Mod: 95

## 2022-02-01 PROCEDURE — 90834 PSYTX W PT 45 MINUTES: CPT | Mod: 95

## 2022-02-09 PROCEDURE — 90834 PSYTX W PT 45 MINUTES: CPT | Mod: 95

## 2022-02-22 PROCEDURE — 99214 OFFICE O/P EST MOD 30 MIN: CPT | Mod: 95

## 2022-02-23 ENCOUNTER — RESULT REVIEW (OUTPATIENT)
Age: 37
End: 2022-02-23

## 2022-03-02 PROCEDURE — 90834 PSYTX W PT 45 MINUTES: CPT | Mod: 95

## 2022-03-09 PROCEDURE — 90834 PSYTX W PT 45 MINUTES: CPT | Mod: 95

## 2022-03-22 PROCEDURE — 90834 PSYTX W PT 45 MINUTES: CPT | Mod: 95

## 2022-03-22 PROCEDURE — 99214 OFFICE O/P EST MOD 30 MIN: CPT | Mod: 95

## 2022-04-06 PROCEDURE — 90834 PSYTX W PT 45 MINUTES: CPT | Mod: 95

## 2022-05-02 PROCEDURE — 90834 PSYTX W PT 45 MINUTES: CPT | Mod: 95

## 2022-05-02 PROCEDURE — 99214 OFFICE O/P EST MOD 30 MIN: CPT | Mod: 95

## 2022-05-19 PROCEDURE — 90832 PSYTX W PT 30 MINUTES: CPT | Mod: 95

## 2022-05-24 PROCEDURE — 99214 OFFICE O/P EST MOD 30 MIN: CPT | Mod: 95

## 2022-05-25 PROCEDURE — 90834 PSYTX W PT 45 MINUTES: CPT | Mod: 95

## 2022-06-08 PROCEDURE — 90832 PSYTX W PT 30 MINUTES: CPT | Mod: 95

## 2022-06-23 PROCEDURE — 99214 OFFICE O/P EST MOD 30 MIN: CPT | Mod: 95

## 2022-07-01 PROCEDURE — 90834 PSYTX W PT 45 MINUTES: CPT | Mod: 95

## 2022-07-27 PROCEDURE — 99214 OFFICE O/P EST MOD 30 MIN: CPT | Mod: 95

## 2022-08-03 PROCEDURE — 90834 PSYTX W PT 45 MINUTES: CPT | Mod: 95

## 2022-08-10 PROCEDURE — 99214 OFFICE O/P EST MOD 30 MIN: CPT | Mod: 95

## 2022-08-11 PROCEDURE — 90834 PSYTX W PT 45 MINUTES: CPT | Mod: 95

## 2022-08-25 PROBLEM — O36.4XX0 MATERNAL CARE FOR INTRAUTERINE DEATH, NOT APPLICABLE OR UNSPECIFIED: Chronic | Status: ACTIVE | Noted: 2021-07-14

## 2022-08-25 PROBLEM — F41.9 ANXIETY DISORDER, UNSPECIFIED: Chronic | Status: ACTIVE | Noted: 2021-07-14

## 2022-08-25 PROBLEM — O03.9 COMPLETE OR UNSPECIFIED SPONTANEOUS ABORTION WITHOUT COMPLICATION: Chronic | Status: ACTIVE | Noted: 2021-07-14

## 2022-08-25 PROCEDURE — 90834 PSYTX W PT 45 MINUTES: CPT | Mod: 95

## 2022-08-29 PROCEDURE — 99213 OFFICE O/P EST LOW 20 MIN: CPT | Mod: 95

## 2022-11-17 ENCOUNTER — APPOINTMENT (OUTPATIENT)
Dept: PEDIATRIC MEDICAL GENETICS | Facility: CLINIC | Age: 37
End: 2022-11-17

## 2022-11-17 PROCEDURE — 96040: CPT | Mod: 95

## 2022-12-29 NOTE — OB RN DELIVERY SUMMARY - NS_PROPHYLABX_OBGYN_ALL_OB
Patient sent in stable condition on room air. Sent with all belongings. Skin is pink warm and dry.      Saima Dove RN  12/29/22 [id number]     N/A

## 2023-03-03 ENCOUNTER — APPOINTMENT (OUTPATIENT)
Dept: PEDIATRIC MEDICAL GENETICS | Facility: CLINIC | Age: 38
End: 2023-03-03
Payer: COMMERCIAL

## 2023-03-03 PROCEDURE — 96040: CPT | Mod: 95

## 2023-06-28 ENCOUNTER — NON-APPOINTMENT (OUTPATIENT)
Age: 38
End: 2023-06-28

## 2023-06-29 ENCOUNTER — NON-APPOINTMENT (OUTPATIENT)
Age: 38
End: 2023-06-29

## 2023-06-29 ENCOUNTER — APPOINTMENT (OUTPATIENT)
Dept: PODIATRY | Facility: CLINIC | Age: 38
End: 2023-06-29
Payer: COMMERCIAL

## 2023-06-29 DIAGNOSIS — L97.919 VARICOSE VEINS OF RIGHT LOWER EXTREMITY WITH BOTH ULCER OF UNSPECIFIED SITE AND INFLAMMATION: ICD-10-CM

## 2023-06-29 DIAGNOSIS — B35.1 TINEA UNGUIUM: ICD-10-CM

## 2023-06-29 DIAGNOSIS — I83.219 VARICOSE VEINS OF RIGHT LOWER EXTREMITY WITH BOTH ULCER OF UNSPECIFIED SITE AND INFLAMMATION: ICD-10-CM

## 2023-06-29 DIAGNOSIS — M79.674 PAIN IN RIGHT TOE(S): ICD-10-CM

## 2023-06-29 PROCEDURE — 99203 OFFICE O/P NEW LOW 30 MIN: CPT | Mod: 25

## 2023-06-29 PROCEDURE — 11720 DEBRIDE NAIL 1-5: CPT

## 2023-06-29 RX ORDER — CICLOPIROX 80 MG/ML
8 SOLUTION TOPICAL
Qty: 1 | Refills: 3 | Status: ACTIVE | COMMUNITY
Start: 2023-06-29 | End: 1900-01-01

## 2023-07-05 PROBLEM — B35.1 ONYCHOMYCOSIS: Status: ACTIVE | Noted: 2023-06-30

## 2023-07-05 PROBLEM — I83.219: Status: ACTIVE | Noted: 2023-06-30

## 2023-07-05 PROBLEM — M79.674 PAIN OF TOE OF RIGHT FOOT: Status: ACTIVE | Noted: 2023-06-30

## 2023-07-06 NOTE — ASSESSMENT
[FreeTextEntry1] : \par Impression: R/O DVT. Onychomycosis. Pain. Varicosities.\par \par Treatment: I aggressively debrided and debulked mycotic nails with a small straight nail splitter and rotary debra. I performed a simple wedge resection. She will use Ciclopirox topically for 3 to 4 months. Discussed its usage. I am going to send her for a venous duplex just to be on the cautious side to R/O DVT. She will follow-up in the office only as needed.  [No Acute Distress] : no acute distress [Well Nourished] : well nourished [Well Developed] : well developed [Normal Sclera/Conjunctiva] : normal sclera/conjunctiva [EOMI] : extraocular movements intact [No Respiratory Distress] : no respiratory distress  [No Accessory Muscle Use] : no accessory muscle use [Coordination Grossly Intact] : coordination grossly intact [Normal Affect] : the affect was normal [Alert and Oriented x3] : oriented to person, place, and time [Normal Insight/Judgement] : insight and judgment were intact [de-identified] : stiff movements

## 2023-07-06 NOTE — PHYSICAL EXAM
[Varicose Veins Of Lower Extremities] : present [Sensation] : the sensory exam was normal to light touch and pinprick [No Focal Deficits] : no focal deficits [Deep Tendon Reflexes (DTR)] : deep tendon reflexes were 2+ and symmetric [Motor Exam] : the motor exam was normal [2+] : left foot dorsalis pedis 2+ [Delayed in the Right Toes] : capillary refills normal in right toes [Delayed in the Left Toes] : capillary refills normal in the left toes [de-identified] : She has slight sensitivity at the calf so I am going to send her for a venous duplex. [FreeTextEntry1] : She has onychomycosis in each and every nail 1, 2, 3, 4 and 5 on the right side. They are white, yellow, thick, dystrophic and a little incurvated at the right fibular border. No pus or infection.

## 2023-07-06 NOTE — HISTORY OF PRESENT ILLNESS
[Sneakers] : al [FreeTextEntry1] : Patient presents today because of 2 problems. She has onychomycosis of the right foot toenails. They are white, yellow, thick, brittle with subungual debris and mycosis.  She has right calf pain. She is on birth control.

## 2023-07-28 ENCOUNTER — APPOINTMENT (OUTPATIENT)
Dept: VASCULAR SURGERY | Facility: CLINIC | Age: 38
End: 2023-07-28
Payer: COMMERCIAL

## 2023-07-28 VITALS
SYSTOLIC BLOOD PRESSURE: 120 MMHG | BODY MASS INDEX: 26.84 KG/M2 | HEART RATE: 90 BPM | OXYGEN SATURATION: 100 % | DIASTOLIC BLOOD PRESSURE: 70 MMHG | TEMPERATURE: 97.6 F | RESPIRATION RATE: 14 BRPM | WEIGHT: 167 LBS | HEIGHT: 66 IN

## 2023-07-28 PROCEDURE — 93970 EXTREMITY STUDY: CPT

## 2023-07-28 PROCEDURE — 99203 OFFICE O/P NEW LOW 30 MIN: CPT

## 2023-07-28 NOTE — HISTORY OF PRESENT ILLNESS
[FreeTextEntry1] : I have just had the pleasure of seeing Mrs. Bailey in consultation from Dr. Arnold for lower extremity pain.\par \par Mrs. Bailey is a 37 year old lady who presents with a one month history of right lower extremity pain. She reports that the pain is intermittent and denies any aggravating or alleviating factors. She denies any symptoms consistent with lower extremity claudication, rest pain or tissue loss. She denies any lower extremity edema, varicose veins, skin pigmentation changes or ulcers. She denies any history of DVT or SVT. She reports that the pain feels similar to symptoms that she experience related to varicose veins that developed during her last (4th) pregnancy. These varicose veins have since resolved. She does note presently wear compression stockings. 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 She is concerned that her use of oral contraception predisposes her to clot formation. She underwent a venous duplex on 6/29/23 at an outside facility, which was negative for DVT.\par \par She denies any history of CAD, CHF, arrhythmia, CVA, CRI, or DM.\par \par Her medical history is significant for anxiety and she admits to being a "hypochondriac", cholecystectomy and HLD\par \par Medications: oral contraceptive \par \par All: Bactrim\par \par SH: non-smoker\par \par FH: NC

## 2023-07-28 NOTE — ASSESSMENT
[FreeTextEntry1] : In summary, Mrs. Bailey presents with episodic right lower extremity pain. She has no evidence of arterial insufficiency per history and exam. A venous duplex was performed and showed no evidence of DVT/SVT/reflux in either extremity. I provided her a prescription for knee high 20-30mmHg compression stockings and instructed her to wear these daily while working. She will follow up as needed.\par \par Thank you for allowing me to participate in the care of this nice lady. Please do not hesitate to contact me with any questions.

## 2023-07-28 NOTE — PHYSICAL EXAM
[de-identified] : On physical exam, the patient is in no acute distress and neurologically intact. The lungs are clear to auscultation and the heart has a regular rate and rhythm. Abdomen is benign post-partum. Bilateral femoral and pedal pulses are palpable. No lower extremity edema, skin pigmentation changes, varicose veins or wounds present. Onychomycosis.

## 2025-02-13 NOTE — PATIENT PROFILE OB - CAREGIVER
PA not needed for Farxiga, refill too soon. Next fill 3/9/2025  Writer will exit and close encounter.  
No